# Patient Record
Sex: MALE | URBAN - METROPOLITAN AREA
[De-identification: names, ages, dates, MRNs, and addresses within clinical notes are randomized per-mention and may not be internally consistent; named-entity substitution may affect disease eponyms.]

---

## 2023-05-26 ENCOUNTER — OUT OF OFFICE VISIT (OUTPATIENT)
Dept: URBAN - METROPOLITAN AREA MEDICAL CENTER 16 | Facility: MEDICAL CENTER | Age: 68
End: 2023-05-26
Payer: MEDICARE

## 2023-05-26 DIAGNOSIS — R19.7 ACUTE DIARRHEA: ICD-10-CM

## 2023-05-26 DIAGNOSIS — K50.90 ABDOMINAL PAIN DESPITE THERAPY FOR CROHN'S DISEASE: ICD-10-CM

## 2023-05-26 DIAGNOSIS — E86.0 DEHYDRATION: ICD-10-CM

## 2023-05-26 PROCEDURE — G8427 DOCREV CUR MEDS BY ELIG CLIN: HCPCS

## 2023-05-26 PROCEDURE — 99204 OFFICE O/P NEW MOD 45 MIN: CPT

## 2023-05-26 PROCEDURE — 99222 1ST HOSP IP/OBS MODERATE 55: CPT

## 2023-05-31 ENCOUNTER — OFFICE VISIT (OUTPATIENT)
Dept: URBAN - METROPOLITAN AREA CLINIC 109 | Facility: CLINIC | Age: 68
End: 2023-05-31
Payer: MEDICARE

## 2023-05-31 VITALS
HEART RATE: 98 BPM | BODY MASS INDEX: 16.15 KG/M2 | SYSTOLIC BLOOD PRESSURE: 102 MMHG | TEMPERATURE: 97.9 F | HEIGHT: 64 IN | WEIGHT: 94.6 LBS | DIASTOLIC BLOOD PRESSURE: 67 MMHG

## 2023-05-31 DIAGNOSIS — Z93.3 COLOSTOMY PRESENT: ICD-10-CM

## 2023-05-31 DIAGNOSIS — Z93.2 ILEOSTOMY IN PLACE: ICD-10-CM

## 2023-05-31 DIAGNOSIS — R19.7 DIARRHEA, UNSPECIFIED: ICD-10-CM

## 2023-05-31 DIAGNOSIS — K90.9 INTESTINAL MALABSORPTION, UNSPECIFIED: ICD-10-CM

## 2023-05-31 DIAGNOSIS — Z87.19 HISTORY OF CROHN'S DISEASE: ICD-10-CM

## 2023-05-31 PROBLEM — 62315008: Status: ACTIVE | Noted: 2023-05-31

## 2023-05-31 PROBLEM — 302112009: Status: ACTIVE | Noted: 2023-05-31

## 2023-05-31 PROBLEM — 112561000119108: Status: ACTIVE | Noted: 2023-05-31

## 2023-05-31 PROBLEM — 302111002: Status: ACTIVE | Noted: 2023-05-31

## 2023-05-31 PROCEDURE — 99204 OFFICE O/P NEW MOD 45 MIN: CPT | Performed by: INTERNAL MEDICINE

## 2023-05-31 RX ORDER — CHOLESTYRAMINE LIGHT 4 G/5.7G
1 SCOOP POWDER, FOR SUSPENSION ORAL
Qty: 90 | Refills: 3 | OUTPATIENT
Start: 2023-05-31

## 2023-06-07 ENCOUNTER — OUT OF OFFICE VISIT (OUTPATIENT)
Dept: URBAN - METROPOLITAN AREA MEDICAL CENTER 16 | Facility: MEDICAL CENTER | Age: 68
End: 2023-06-07

## 2023-06-07 ENCOUNTER — CLAIMS CREATED FROM THE CLAIM WINDOW (OUTPATIENT)
Dept: URBAN - METROPOLITAN AREA MEDICAL CENTER 16 | Facility: MEDICAL CENTER | Age: 68
End: 2023-06-07
Payer: MEDICARE

## 2023-06-07 DIAGNOSIS — A02.9: ICD-10-CM

## 2023-06-07 DIAGNOSIS — Z93.2 HISTORY OF ILEOSTOMY: ICD-10-CM

## 2023-06-07 DIAGNOSIS — K50.80 CROHN'S COLITIS: ICD-10-CM

## 2023-06-07 PROCEDURE — G8427 DOCREV CUR MEDS BY ELIG CLIN: HCPCS | Performed by: INTERNAL MEDICINE

## 2023-06-07 PROCEDURE — 99232 SBSQ HOSP IP/OBS MODERATE 35: CPT | Performed by: INTERNAL MEDICINE

## 2023-06-07 PROCEDURE — 99222 1ST HOSP IP/OBS MODERATE 55: CPT | Performed by: INTERNAL MEDICINE

## 2023-06-20 ENCOUNTER — OUT OF OFFICE VISIT (OUTPATIENT)
Dept: URBAN - METROPOLITAN AREA MEDICAL CENTER 16 | Facility: MEDICAL CENTER | Age: 68
End: 2023-06-20
Payer: MEDICARE

## 2023-06-20 DIAGNOSIS — Z93.2 HISTORY OF ILEOSTOMY: ICD-10-CM

## 2023-06-20 DIAGNOSIS — Z87.19 H/O CROHN'S DISEASE: ICD-10-CM

## 2023-06-20 DIAGNOSIS — R19.7 ACUTE DIARRHEA: ICD-10-CM

## 2023-06-20 PROCEDURE — 99232 SBSQ HOSP IP/OBS MODERATE 35: CPT | Performed by: INTERNAL MEDICINE

## 2023-06-20 PROCEDURE — 99222 1ST HOSP IP/OBS MODERATE 55: CPT | Performed by: INTERNAL MEDICINE

## 2023-06-20 PROCEDURE — G8427 DOCREV CUR MEDS BY ELIG CLIN: HCPCS | Performed by: INTERNAL MEDICINE

## 2023-06-21 ENCOUNTER — OFFICE VISIT (OUTPATIENT)
Dept: URBAN - METROPOLITAN AREA CLINIC 109 | Facility: CLINIC | Age: 68
End: 2023-06-21

## 2023-06-22 ENCOUNTER — OUT OF OFFICE VISIT (OUTPATIENT)
Dept: URBAN - METROPOLITAN AREA MEDICAL CENTER 16 | Facility: MEDICAL CENTER | Age: 68
End: 2023-06-22
Payer: MEDICARE

## 2023-06-22 DIAGNOSIS — Z93.2 HISTORY OF ILEOSTOMY: ICD-10-CM

## 2023-06-22 DIAGNOSIS — K50.80 CROHN'S COLITIS: ICD-10-CM

## 2023-06-22 DIAGNOSIS — R19.7 ACUTE DIARRHEA: ICD-10-CM

## 2023-06-22 PROCEDURE — 99232 SBSQ HOSP IP/OBS MODERATE 35: CPT | Performed by: INTERNAL MEDICINE

## 2023-06-23 ENCOUNTER — OUT OF OFFICE VISIT (OUTPATIENT)
Dept: URBAN - METROPOLITAN AREA MEDICAL CENTER 16 | Facility: MEDICAL CENTER | Age: 68
End: 2023-06-23
Payer: MEDICARE

## 2023-06-23 DIAGNOSIS — K50.90 CROHN DISEASE: ICD-10-CM

## 2023-06-23 PROCEDURE — 44388 COLONOSCOPY THRU STOMA SPX: CPT | Performed by: INTERNAL MEDICINE

## 2023-06-23 PROCEDURE — 44382 SMALL BOWEL ENDOSCOPY: CPT | Performed by: INTERNAL MEDICINE

## 2023-06-29 ENCOUNTER — OUT OF OFFICE VISIT (OUTPATIENT)
Dept: URBAN - METROPOLITAN AREA MEDICAL CENTER 16 | Facility: MEDICAL CENTER | Age: 68
End: 2023-06-29
Payer: MEDICARE

## 2023-06-29 DIAGNOSIS — Z93.3 CECOSTOMY STATUS: ICD-10-CM

## 2023-06-29 DIAGNOSIS — K50.80 CROHN'S COLITIS: ICD-10-CM

## 2023-06-29 DIAGNOSIS — R10.84 ABDOMINAL CRAMPING, GENERALIZED: ICD-10-CM

## 2023-06-29 PROCEDURE — 99232 SBSQ HOSP IP/OBS MODERATE 35: CPT | Performed by: INTERNAL MEDICINE

## 2023-06-30 ENCOUNTER — OUT OF OFFICE VISIT (OUTPATIENT)
Dept: URBAN - METROPOLITAN AREA MEDICAL CENTER 16 | Facility: MEDICAL CENTER | Age: 68
End: 2023-06-30
Payer: MEDICARE

## 2023-06-30 DIAGNOSIS — Z93.3 CECOSTOMY STATUS: ICD-10-CM

## 2023-06-30 DIAGNOSIS — K50.80 CROHN'S COLITIS: ICD-10-CM

## 2023-06-30 DIAGNOSIS — R10.84 ABDOMINAL CRAMPING, GENERALIZED: ICD-10-CM

## 2023-06-30 PROCEDURE — 99232 SBSQ HOSP IP/OBS MODERATE 35: CPT

## 2023-07-21 ENCOUNTER — OFFICE VISIT (OUTPATIENT)
Dept: URBAN - METROPOLITAN AREA CLINIC 118 | Facility: CLINIC | Age: 68
End: 2023-07-21

## 2023-07-21 RX ORDER — CHOLESTYRAMINE LIGHT 4 G/5.7G
1 SCOOP POWDER, FOR SUSPENSION ORAL
Qty: 90 | Refills: 3 | Status: ACTIVE | COMMUNITY
Start: 2023-05-31

## 2023-08-03 ENCOUNTER — TELEPHONE ENCOUNTER (OUTPATIENT)
Dept: URBAN - METROPOLITAN AREA CLINIC 109 | Facility: CLINIC | Age: 68
End: 2023-08-03

## 2023-08-03 RX ORDER — BUDESONIDE 3 MG/1
2 CAPSULES CAPSULE ORAL ONCE A DAY
Qty: 60 | Refills: 1 | OUTPATIENT
Start: 2023-08-03

## 2023-08-14 ENCOUNTER — OFFICE VISIT (OUTPATIENT)
Dept: URBAN - METROPOLITAN AREA CLINIC 118 | Facility: CLINIC | Age: 68
End: 2023-08-14
Payer: MEDICARE

## 2023-08-14 VITALS
DIASTOLIC BLOOD PRESSURE: 72 MMHG | WEIGHT: 97 LBS | TEMPERATURE: 98.1 F | HEART RATE: 72 BPM | HEIGHT: 64 IN | SYSTOLIC BLOOD PRESSURE: 110 MMHG | BODY MASS INDEX: 16.56 KG/M2

## 2023-08-14 DIAGNOSIS — K90.9 INTESTINAL MALABSORPTION, UNSPECIFIED: ICD-10-CM

## 2023-08-14 DIAGNOSIS — Z93.3 COLOSTOMY PRESENT: ICD-10-CM

## 2023-08-14 DIAGNOSIS — Z87.19 HISTORY OF CROHN'S DISEASE: ICD-10-CM

## 2023-08-14 DIAGNOSIS — R19.7 DIARRHEA, UNSPECIFIED: ICD-10-CM

## 2023-08-14 PROCEDURE — 99214 OFFICE O/P EST MOD 30 MIN: CPT | Performed by: INTERNAL MEDICINE

## 2023-08-14 RX ORDER — BUDESONIDE 3 MG/1
2 CAPSULES CAPSULE ORAL ONCE A DAY
Qty: 60 | Refills: 5 | OUTPATIENT
Start: 2023-08-14

## 2023-08-14 RX ORDER — BUDESONIDE 3 MG/1
2 CAPSULES CAPSULE ORAL ONCE A DAY
Qty: 60 | Refills: 1 | Status: ACTIVE | COMMUNITY
Start: 2023-08-03

## 2023-08-14 RX ORDER — CHOLESTYRAMINE LIGHT 4 G/5.7G
1 SCOOP POWDER, FOR SUSPENSION ORAL
Qty: 90 | Refills: 3 | Status: ACTIVE | COMMUNITY
Start: 2023-05-31

## 2023-08-14 NOTE — HPI-TODAY'S VISIT:
The patient presents for hospital f/u appt.  see previous notes regarding past medical history details.  had ileostomy reversal a few weeks ago.  colostomy still in place.  output is slowing down (has to change ostomy a ~6 times per day).  reports having intermittent "crohn's flares" manifesting as abd pain and self-limiting constipation ( < 12 hours).  has been on budesonide since hospitalization and feels that "flares" have decreased in frequency.  + 6 lb weight gain in past couple months.

## 2023-08-22 ENCOUNTER — TELEPHONE ENCOUNTER (OUTPATIENT)
Dept: URBAN - METROPOLITAN AREA CLINIC 109 | Facility: CLINIC | Age: 68
End: 2023-08-22

## 2023-08-22 RX ORDER — BUDESONIDE 3 MG/1
2 CAPSULES CAPSULE ORAL ONCE A DAY
Qty: 60 | Refills: 5 | OUTPATIENT
Start: 2023-08-23

## 2023-09-11 ENCOUNTER — OFFICE VISIT (OUTPATIENT)
Dept: URBAN - METROPOLITAN AREA CLINIC 118 | Facility: CLINIC | Age: 68
End: 2023-09-11

## 2023-10-09 ENCOUNTER — OFFICE VISIT (OUTPATIENT)
Dept: URBAN - METROPOLITAN AREA CLINIC 118 | Facility: CLINIC | Age: 68
End: 2023-10-09
Payer: MEDICARE

## 2023-10-09 ENCOUNTER — LAB OUTSIDE AN ENCOUNTER (OUTPATIENT)
Dept: URBAN - METROPOLITAN AREA CLINIC 118 | Facility: CLINIC | Age: 68
End: 2023-10-09

## 2023-10-09 VITALS
BODY MASS INDEX: 17.28 KG/M2 | SYSTOLIC BLOOD PRESSURE: 122 MMHG | HEIGHT: 64 IN | WEIGHT: 101.2 LBS | TEMPERATURE: 98.8 F | DIASTOLIC BLOOD PRESSURE: 67 MMHG | HEART RATE: 70 BPM

## 2023-10-09 DIAGNOSIS — Z93.3 COLOSTOMY PRESENT: ICD-10-CM

## 2023-10-09 DIAGNOSIS — R19.7 DIARRHEA, UNSPECIFIED: ICD-10-CM

## 2023-10-09 DIAGNOSIS — K90.9 INTESTINAL MALABSORPTION, UNSPECIFIED: ICD-10-CM

## 2023-10-09 DIAGNOSIS — Z87.19 HISTORY OF CROHN'S DISEASE: ICD-10-CM

## 2023-10-09 PROCEDURE — 99214 OFFICE O/P EST MOD 30 MIN: CPT | Performed by: INTERNAL MEDICINE

## 2023-10-09 RX ORDER — CHOLESTYRAMINE LIGHT 4 G/5.7G
1 SCOOP POWDER, FOR SUSPENSION ORAL
Qty: 90 | Refills: 3 | Status: ACTIVE | COMMUNITY
Start: 2023-05-31

## 2023-10-09 RX ORDER — BUDESONIDE 3 MG/1
2 CAPSULES CAPSULE ORAL ONCE A DAY
Qty: 60 | Refills: 5 | Status: ACTIVE | COMMUNITY
Start: 2023-08-14

## 2023-10-09 RX ORDER — BUDESONIDE 3 MG/1
2 CAPSULES CAPSULE ORAL ONCE A DAY
Qty: 60 | Refills: 5 | OUTPATIENT

## 2023-10-09 NOTE — HPI-TODAY'S VISIT:
The patient presents for hospital f/u appt.  see previous notes regarding past medical history details.  had ileostomy reversal a few weeks ago.  colostomy still in place.  ostomy output has remained improved.  self-decreased budesonide dose to 3 mg per day.  denies "flares" of sx's since being on budesonide.  remains active (walks a lot).  + weight gain since last appt.

## 2023-10-10 LAB
A/G RATIO: 1.1
ABSOLUTE BASOPHILS: 51
ABSOLUTE EOSINOPHILS: 97
ABSOLUTE LYMPHOCYTES: 2076
ABSOLUTE MONOCYTES: 597
ABSOLUTE NEUTROPHILS: 2280
ALBUMIN: 3.9
ALKALINE PHOSPHATASE: 59
ALT (SGPT): 25
AST (SGOT): 28
BASOPHILS: 1
BILIRUBIN, TOTAL: 0.6
BUN/CREATININE RATIO: (no result)
BUN: 14
CALCIUM: 9.3
CARBON DIOXIDE, TOTAL: 25
CHLORIDE: 103
CREATININE: 1.05
EGFR: 77
EOSINOPHILS: 1.9
GLOBULIN, TOTAL: 3.7
GLUCOSE: 68
HEMATOCRIT: 37.7
HEMOGLOBIN: 12.5
LYMPHOCYTES: 40.7
MCH: 28.9
MCHC: 33.2
MCV: 87.1
MONOCYTES: 11.7
MPV: 11.2
NEUTROPHILS: 44.7
PLATELET COUNT: 126
POTASSIUM: 4.4
PROTEIN, TOTAL: 7.6
RDW: 11.7
RED BLOOD CELL COUNT: 4.33
SODIUM: 139
WHITE BLOOD CELL COUNT: 5.1

## 2024-04-08 ENCOUNTER — OV EP (OUTPATIENT)
Dept: URBAN - METROPOLITAN AREA CLINIC 118 | Facility: CLINIC | Age: 69
End: 2024-04-08
Payer: MEDICARE

## 2024-04-08 VITALS
HEART RATE: 69 BPM | HEIGHT: 64 IN | TEMPERATURE: 97.2 F | BODY MASS INDEX: 17.86 KG/M2 | WEIGHT: 104.6 LBS | DIASTOLIC BLOOD PRESSURE: 64 MMHG | SYSTOLIC BLOOD PRESSURE: 107 MMHG

## 2024-04-08 DIAGNOSIS — Z87.19 HISTORY OF CROHN'S DISEASE: ICD-10-CM

## 2024-04-08 DIAGNOSIS — Z93.3 COLOSTOMY PRESENT: ICD-10-CM

## 2024-04-08 DIAGNOSIS — K90.9 INTESTINAL MALABSORPTION, UNSPECIFIED: ICD-10-CM

## 2024-04-08 DIAGNOSIS — R19.7 DIARRHEA, UNSPECIFIED: ICD-10-CM

## 2024-04-08 PROCEDURE — 99214 OFFICE O/P EST MOD 30 MIN: CPT | Performed by: INTERNAL MEDICINE

## 2024-04-08 RX ORDER — BUDESONIDE 3 MG/1
2 CAPSULES CAPSULE ORAL ONCE A DAY
Qty: 60 | Refills: 5 | Status: ACTIVE | COMMUNITY

## 2024-04-08 RX ORDER — CHOLESTYRAMINE LIGHT 4 G/5.7G
1 SCOOP POWDER, FOR SUSPENSION ORAL
Qty: 90 | Refills: 3 | Status: ACTIVE | COMMUNITY
Start: 2023-05-31

## 2024-04-08 RX ORDER — BUDESONIDE 3 MG/1
2 CAPSULES CAPSULE ORAL ONCE A DAY
Qty: 60 | Refills: 5 | OUTPATIENT

## 2024-04-08 NOTE — HPI-TODAY'S VISIT:
The patient presents for f/u appt.  doing well on budesonide once daily (see previous notes regarding details of crohn's disease history).  weight stable.  no blood in colostomy.

## 2024-06-07 ENCOUNTER — TELEPHONE ENCOUNTER (OUTPATIENT)
Dept: URBAN - METROPOLITAN AREA CLINIC 118 | Facility: CLINIC | Age: 69
End: 2024-06-07

## 2024-06-17 ENCOUNTER — DASHBOARD ENCOUNTERS (OUTPATIENT)
Age: 69
End: 2024-06-17

## 2024-06-17 ENCOUNTER — OFFICE VISIT (OUTPATIENT)
Dept: URBAN - METROPOLITAN AREA CLINIC 118 | Facility: CLINIC | Age: 69
End: 2024-06-17

## 2024-06-17 VITALS
HEART RATE: 63 BPM | BODY MASS INDEX: 17.24 KG/M2 | HEIGHT: 64 IN | SYSTOLIC BLOOD PRESSURE: 102 MMHG | TEMPERATURE: 97.3 F | WEIGHT: 101 LBS | DIASTOLIC BLOOD PRESSURE: 60 MMHG

## 2024-06-17 DIAGNOSIS — K90.9 INTESTINAL MALABSORPTION, UNSPECIFIED: ICD-10-CM

## 2024-06-17 DIAGNOSIS — R10.84 GENERALIZED ABDOMINAL PAIN: ICD-10-CM

## 2024-06-17 DIAGNOSIS — Z93.3 COLOSTOMY PRESENT: ICD-10-CM

## 2024-06-17 DIAGNOSIS — Z87.19 HISTORY OF CROHN'S DISEASE: ICD-10-CM

## 2024-06-17 RX ORDER — CHOLESTYRAMINE LIGHT 4 G/5.7G
1 SCOOP POWDER, FOR SUSPENSION ORAL
Qty: 90 | Refills: 3 | Status: ACTIVE | COMMUNITY
Start: 2023-05-31

## 2024-06-17 RX ORDER — BUDESONIDE 3 MG/1
2 CAPSULES CAPSULE ORAL ONCE A DAY
Qty: 60 | Refills: 5 | Status: ACTIVE | COMMUNITY

## 2024-06-17 RX ORDER — BUDESONIDE 3 MG/1
2 CAPSULES CAPSULE ORAL ONCE A DAY
Qty: 60 | Refills: 5 | OUTPATIENT

## 2024-06-17 NOTE — HPI-TODAY'S VISIT:
The patient presents for evaluation of abd pain and f/u.  pt with h/o ileo-colonic crohn's disease (see prior notes for details) and colostomy.  he had 2 days of right sided abd pain ~2 weeks ago.  this has since resolved.  no increased output in colostomy.  had some self limiting n/v.  on budesonide (3 mg) daily.  sx's better now.  weight mostly stable.

## 2024-08-08 ENCOUNTER — TELEPHONE ENCOUNTER (OUTPATIENT)
Dept: URBAN - METROPOLITAN AREA CLINIC 95 | Facility: CLINIC | Age: 69
End: 2024-08-08

## 2024-08-08 RX ORDER — BUDESONIDE 3 MG/1
2 CAPSULES CAPSULE ORAL ONCE A DAY
Qty: 60 | Refills: 5

## 2024-09-20 ENCOUNTER — OFFICE VISIT (OUTPATIENT)
Dept: URBAN - METROPOLITAN AREA CLINIC 118 | Facility: CLINIC | Age: 69
End: 2024-09-20
Payer: MEDICARE

## 2024-09-20 VITALS
DIASTOLIC BLOOD PRESSURE: 71 MMHG | TEMPERATURE: 97.7 F | SYSTOLIC BLOOD PRESSURE: 126 MMHG | WEIGHT: 102.2 LBS | HEIGHT: 64 IN | BODY MASS INDEX: 17.45 KG/M2 | HEART RATE: 76 BPM

## 2024-09-20 DIAGNOSIS — R10.84 GENERALIZED ABDOMINAL PAIN: ICD-10-CM

## 2024-09-20 DIAGNOSIS — K90.9 INTESTINAL MALABSORPTION, UNSPECIFIED: ICD-10-CM

## 2024-09-20 DIAGNOSIS — Z87.19 HISTORY OF CROHN'S DISEASE: ICD-10-CM

## 2024-09-20 DIAGNOSIS — Z93.3 COLOSTOMY PRESENT: ICD-10-CM

## 2024-09-20 PROCEDURE — 99214 OFFICE O/P EST MOD 30 MIN: CPT | Performed by: INTERNAL MEDICINE

## 2024-09-20 RX ORDER — CHOLESTYRAMINE LIGHT 4 G/5.7G
1 SCOOP POWDER, FOR SUSPENSION ORAL
Qty: 90 | Refills: 3 | COMMUNITY
Start: 2023-05-31

## 2024-09-20 RX ORDER — BUDESONIDE 3 MG/1
2 CAPSULES CAPSULE ORAL ONCE A DAY
Qty: 60 | Refills: 5 | COMMUNITY

## 2024-09-20 RX ORDER — BUDESONIDE 3 MG/1
3 CAPSULES CAPSULE ORAL ONCE A DAY
Qty: 270 CAPSULE | Refills: 0 | OUTPATIENT

## 2024-09-20 NOTE — HPI-TODAY'S VISIT:
The patient presents for f/u appt.  he had a "flare" of sx's a few weeks after after dietary indescrition per pt and had self limiting n/v episodes. denies abd pain, hematochezia or blood/increased output in colostomy.  weight is stable.  on budesonide 6 mg daily.  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

## 2024-09-20 NOTE — PHYSICAL EXAM HENT:
Head, normocephalic, atraumatic, Face, Face within normal limits, Ears, External ears within normal limits, Nose/Nasopharynx, External nose normal appearance, nares patent, no nasal discharge, Mouth and Throat, Oral cavity appearance normal, Lips, Appearance normal
20 feet x 2, limited by JOSIAH/radha

## 2024-10-07 ENCOUNTER — OFFICE VISIT (OUTPATIENT)
Dept: URBAN - METROPOLITAN AREA CLINIC 118 | Facility: CLINIC | Age: 69
End: 2024-10-07